# Patient Record
Sex: MALE | Race: WHITE
[De-identification: names, ages, dates, MRNs, and addresses within clinical notes are randomized per-mention and may not be internally consistent; named-entity substitution may affect disease eponyms.]

---

## 2019-09-02 ENCOUNTER — HOSPITAL ENCOUNTER (EMERGENCY)
Dept: HOSPITAL 4 - SED | Age: 49
Discharge: HOME | End: 2019-09-02
Payer: COMMERCIAL

## 2019-09-02 VITALS — HEIGHT: 68 IN | WEIGHT: 232 LBS | BODY MASS INDEX: 35.16 KG/M2

## 2019-09-02 VITALS — SYSTOLIC BLOOD PRESSURE: 123 MMHG

## 2019-09-02 VITALS — SYSTOLIC BLOOD PRESSURE: 114 MMHG

## 2019-09-02 DIAGNOSIS — L60.0: Primary | ICD-10-CM

## 2019-09-02 DIAGNOSIS — L08.89: ICD-10-CM

## 2019-09-02 PROCEDURE — 99283 EMERGENCY DEPT VISIT LOW MDM: CPT

## 2019-09-02 PROCEDURE — 96372 THER/PROPH/DIAG INJ SC/IM: CPT

## 2019-09-02 NOTE — NUR
Patient given written and verbal discharge instructions and verbalizes 
understanding.  ER MD discussed with patient the results and treatment 
provided. Patient in stable condition. ID arm band removed. Rx of Keflex given. 
Patient educated on pain management and to follow up with PMD. Pain Scale 0/10 
Opportunity for questions provided and answered. Medication side effect fact 
sheet provided.